# Patient Record
(demographics unavailable — no encounter records)

---

## 2025-01-16 NOTE — HISTORY OF PRESENT ILLNESS
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   Underwent OLT 1/9/2023 with solumedrol/simulect induction, he was d/c on POD # 9 s/p OLT with good graft function.   He was re-admitted 1/22-24/23 - after suffering a fall from standing. His trauma w/u was negative. LFTs remained stable and WNLs and he was d/c on 1/24 admitted again last week with toxic prograf levels and NISHA; was taking wrong dose of tacro  Explant Pathology: Cirrhosis with background steatohepatitis. One jose cruz hepatis lymph node, negative for carcinoma (0/1) - Gallbladder with chronic cholecystitis - Donor gallbladder, removal - Gallbladder with acute and chronic inflammation - One lymph node, negative for carcinoma (0/1)  treated at [facility] for left cheek swelling with cipro x 5d ?sialoadenitis/sialolithiasis Re-admitted [facility] 4/8/23-4/12/23 with abdo pain, n/v, diarrhea -CT - enteritis; -GI PCR:  norovirus symptoms self-resolved Admitted July 2023  - Patient had episode of moderate ACR - treated with IV steroid bolus and prednisone taper Admitted August 2023 - Gabe DELGADO for diarrhea and NISHA in setting for supratherapeutic tacro level and transient SBO Patient readmitted to [facility] for SBO with EVELINE In September with Supratherapeutic tacro level  Interval hx: 11/4/2024 Current IS: Tacro 1 mg / 1 mg  with Myfortic 360mg BID No recent labs done.  Doing overall well with no new complaints.  Spending more time with son.  Anxiety is better at the time.  Denies etoh slips since last visit.  Continue on naltrexone which helps with the craving.  Had colonoscopy done with Dr. Maldonado in 12/10/2024 no polyps, mild diverticulosis. Repeat in 5 years.

## 2025-01-16 NOTE — ASSESSMENT
[FreeTextEntry1] : Moisés Garsia is a 55 yo male with pmhx of AUD reportedly sober for 2-3 years, COVID PNA requiring intubation. ETOH Cirrhosis s/p liver transplant 1/9/23. Multiple admissions for hyperkalemia. Prior admission for norovirus. Concern for HAT - negative on CT during that admission. Had admissions for colitis / Admission for ACR / SBO s/p Ex Lap  GRAFT: hx of ACR due to likely missed medications - biopsy proven but resolved now IMMUNO: Continue tacro 1/1 Myfortic 360mg BID. off pred off pphx.  CMV negative on 7/1/2024 off Valcyte  Relapse ETOH use - pressured in social situations - d/w Psych - will need therapy down the road - never enrolled in RPP -continue naltrexone 50mg PO  PPX: ASA  NISHA - creatinine baseline around 1.1. very responsive to fluid. - instructed to increase oral hydration.   Mental Health / sw: Ongoing counseling with SW due to separation from wife - visitation with supervision - continue Mirtazapine increase to 30mg nightly. encourage to establish care with mental health provider.   #Health maintenance EGD--2/2021 gastric varices, small hiatal hernia and healing duodenal ulcer. path no intestinal metaplasia or h-pylori. no follow up needed.  Colonoscopy--Colonoscopy done in 12/2024 no colonic polyps. mild diverticulosis. repeat in 5 years due in 12/2029. Chest CT (smoking hx) --non smoker.  DEXA- -done in 11/2024 osteopenia continue on vitamin D and calcium. repeat in 2 years.  Skin check--had appointment with Dermatologist Dr. Scott Busch in 2024.  Vaccination-- Flu - declined  / COVID 1 dose in 2022 may need 2nd shot-- declined  / PCV done in 2022 per HIE records/ need tdap booster/ need Shingrix patient agreed to obtain at [facility].   Tele visit in 3 months  RPA in 6 months.

## 2025-01-16 NOTE — HISTORY OF PRESENT ILLNESS
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   Underwent OLT 1/9/2023 with solumedrol/simulect induction, he was d/c on POD # 9 s/p OLT with good graft function.   He was re-admitted 1/22-24/23 - after suffering a fall from standing. His trauma w/u was negative. LFTs remained stable and WNLs and he was d/c on 1/24 admitted again last week with toxic prograf levels and NISHA; was taking wrong dose of tacro  Explant Pathology: Cirrhosis with background steatohepatitis. One jose cruz hepatis lymph node, negative for carcinoma (0/1) - Gallbladder with chronic cholecystitis - Donor gallbladder, removal - Gallbladder with acute and chronic inflammation - One lymph node, negative for carcinoma (0/1)  treated at [facility] for left cheek swelling with cipro x 5d ?sialoadenitis/sialolithiasis Re-admitted [facility] 4/8/23-4/12/23 with abdo pain, n/v, diarrhea -CT - enteritis; -GI PCR:  norovirus symptoms self-resolved Admitted July 2023  - Patient had episode of moderate ACR - treated with IV steroid bolus and prednisone taper Admitted August 2023 - Gabe DELGADO for diarrhea and NISHA in setting for supratherapeutic tacro level and transient SBO Patient readmitted to [facility] for SBO with EVELINE In September with Supratherapeutic tacro level  Interval hx: 11/4/2024 Current IS: Tacro 1 mg / 1 mg  with Myfortic 360mg BID No recent labs done.  Doing overall well with no new complaints.  Spending more time with son.  Anxiety is better at the time.  Denies etoh slips since last visit.  Continue on naltrexone which helps with the craving.  Had colonoscopy done with Dr. Maldonado in 12/10/2024 no polyps, mild diverticulosis. Repeat in 5 years.

## 2025-01-16 NOTE — END OF VISIT
[] : Fellow [FreeTextEntry3] : 54M OLT for ETOH liver disease Post transplant ACR + SBO and ETOH lapses Now doing better Good graft function Traveling between [place] and [place] Doing well - no changes to IS at this time Continue Naltrexone 50mg PO   RTC 6M in person  3M Televisit

## 2025-01-16 NOTE — ASSESSMENT
[FreeTextEntry1] : Moisés Garsia is a 53 yo male with pmhx of AUD reportedly sober for 2-3 years, COVID PNA requiring intubation. ETOH Cirrhosis s/p liver transplant 1/9/23. Multiple admissions for hyperkalemia. Prior admission for norovirus. Concern for HAT - negative on CT during that admission. Had admissions for colitis / Admission for ACR / SBO s/p Ex Lap  GRAFT: hx of ACR due to likely missed medications - biopsy proven but resolved now IMMUNO: Continue tacro 1/1 Myfortic 360mg BID. off pred off pphx.  CMV negative on 7/1/2024 off Valcyte  Relapse ETOH use - pressured in social situations - d/w Psych - will need therapy down the road - never enrolled in RPP -continue naltrexone 50mg PO  PPX: ASA  NISHA - creatinine baseline around 1.1. very responsive to fluid. - instructed to increase oral hydration.   Mental Health / sw: Ongoing counseling with SW due to separation from wife - visitation with supervision - continue Mirtazapine increase to 30mg nightly. encourage to establish care with mental health provider.   #Health maintenance EGD--2/2021 gastric varices, small hiatal hernia and healing duodenal ulcer. path no intestinal metaplasia or h-pylori. no follow up needed.  Colonoscopy--Colonoscopy done in 12/2024 no colonic polyps. mild diverticulosis. repeat in 5 years due in 12/2029. Chest CT (smoking hx) --non smoker.  DEXA- -done in 11/2024 osteopenia continue on vitamin D and calcium. repeat in 2 years.  Skin check--had appointment with Dermatologist Dr. Scott Busch in 2024.  Vaccination-- Flu - declined  / COVID 1 dose in 2022 may need 2nd shot-- declined  / PCV done in 2022 per HIE records/ need tdap booster/ need Shingrix patient agreed to obtain at [facility].   Tele visit in 3 months  RPA in 6 months.